# Patient Record
Sex: MALE | Race: WHITE | NOT HISPANIC OR LATINO | Employment: PART TIME | URBAN - METROPOLITAN AREA
[De-identification: names, ages, dates, MRNs, and addresses within clinical notes are randomized per-mention and may not be internally consistent; named-entity substitution may affect disease eponyms.]

---

## 2023-10-25 ENCOUNTER — OFFICE VISIT (OUTPATIENT)
Dept: CARDIOLOGY | Facility: CLINIC | Age: 68
End: 2023-10-25
Payer: MEDICARE

## 2023-10-25 VITALS
DIASTOLIC BLOOD PRESSURE: 60 MMHG | SYSTOLIC BLOOD PRESSURE: 126 MMHG | HEIGHT: 75 IN | WEIGHT: 315 LBS | HEART RATE: 94 BPM | BODY MASS INDEX: 39.17 KG/M2

## 2023-10-25 DIAGNOSIS — I25.84 CORONARY ARTERY CALCIFICATION: Primary | ICD-10-CM

## 2023-10-25 DIAGNOSIS — I10 ESSENTIAL HYPERTENSION: ICD-10-CM

## 2023-10-25 DIAGNOSIS — I25.10 CORONARY ARTERY CALCIFICATION: Primary | ICD-10-CM

## 2023-10-25 PROCEDURE — 1159F MED LIST DOCD IN RCRD: CPT | Performed by: INTERNAL MEDICINE

## 2023-10-25 PROCEDURE — 99204 OFFICE O/P NEW MOD 45 MIN: CPT | Performed by: INTERNAL MEDICINE

## 2023-10-25 PROCEDURE — 1160F RVW MEDS BY RX/DR IN RCRD: CPT | Performed by: INTERNAL MEDICINE

## 2023-10-25 PROCEDURE — 93000 ELECTROCARDIOGRAM COMPLETE: CPT | Performed by: INTERNAL MEDICINE

## 2023-10-25 RX ORDER — AMOXICILLIN 500 MG
500 CAPSULE ORAL DAILY
COMMUNITY

## 2023-10-25 RX ORDER — ASPIRIN 81 MG/1
81 TABLET ORAL DAILY
Qty: 90 TABLET | Refills: 3 | Status: SHIPPED | OUTPATIENT
Start: 2023-10-25

## 2023-10-25 RX ORDER — ATORVASTATIN CALCIUM 10 MG/1
10 TABLET, FILM COATED ORAL DAILY
Qty: 30 TABLET | Refills: 11 | Status: SHIPPED | OUTPATIENT
Start: 2023-10-25 | End: 2023-10-26

## 2023-10-25 RX ORDER — IRBESARTAN 150 MG/1
150 TABLET ORAL DAILY
COMMUNITY
Start: 2023-10-06

## 2023-10-25 NOTE — PROGRESS NOTES
[facility]      Patient Name: Taiwo Shannon  :1955  Age: 68 y.o.  Encounter Provider:  Tom Alegre Jr, MD      Chief Complaint:   Chief Complaint   Patient presents with    evalated cardiac calcium score         HPI  Taiwo Shannon is a 68 y.o. male past medical history morbid obesity, hypertension with family history of coronary artery disease presents for initial evaluation of abnormal coronary calcium score.  Patient has 1 brother who had an LAD infarct at age 66 and another who had a calcium score of 1400.  He decided to get calcium scoring on his own and this came back at 757.  He is not very active these days but with the activities of daily living he denies chest pain or shortness of air.  No orthopnea, PND or edema.  No palpitations, dizziness or syncope.  He smokes 1 cigar daily and denies alcohol or illicit drug use.  No cardiac complaints at time of interview.      The following portions of the patient's history were reviewed and updated as appropriate: allergies, current medications, past family history, past medical history, past social history, past surgical history and problem list.      Review of Systems   Constitutional: Negative for chills and fever.   HENT:  Negative for hoarse voice and sore throat.    Eyes:  Negative for double vision and photophobia.   Cardiovascular:  Positive for palpitations. Negative for chest pain, leg swelling, near-syncope, orthopnea, paroxysmal nocturnal dyspnea and syncope.   Respiratory:  Negative for cough and wheezing.    Skin:  Negative for poor wound healing and rash.   Musculoskeletal:  Negative for arthritis and joint swelling.   Gastrointestinal:  Negative for bloating, abdominal pain, hematemesis and hematochezia.   Neurological:  Negative for dizziness and focal weakness.   Psychiatric/Behavioral:  Negative for depression and suicidal ideas.      OBJECTIVE:   Vital Signs  Vitals:    10/25/23 1318   BP: 126/60   Pulse:  "94     Estimated body mass index is 40.8 kg/m² as calculated from the following:    Height as of this encounter: 190.5 cm (75\").    Weight as of this encounter: 148 kg (326 lb 6.4 oz).    Vitals reviewed.   Constitutional:       Appearance: Healthy appearance. Not in distress.   Neck:      Vascular: No JVR. JVD normal.   Pulmonary:      Effort: Pulmonary effort is normal.      Breath sounds: Normal breath sounds. No wheezing. No rhonchi. No rales.   Chest:      Chest wall: Not tender to palpatation.   Cardiovascular:      PMI at left midclavicular line. Normal rate. Regular rhythm. Normal S1. Normal S2.       Murmurs: There is no murmur.      No gallop.  No click. No rub.   Pulses:     Intact distal pulses.   Edema:     Peripheral edema absent.   Abdominal:      General: Bowel sounds are normal.      Palpations: Abdomen is soft.      Tenderness: There is no abdominal tenderness.   Musculoskeletal: Normal range of motion.         General: No tenderness. Skin:     General: Skin is warm and dry.   Neurological:      General: No focal deficit present.      Mental Status: Alert and oriented to person, place and time.       ECG 12 Lead    Date/Time: 10/25/2023 1:25 PM  Performed by: Tom Alegre Jr., MD    Authorized by: Tom Alegre Jr., MD  Comparison: not compared with previous ECG   Previous ECG: no previous ECG available  Rhythm: sinus rhythm  Ectopy: unifocal PVCs    Clinical impression: non-specific ECG           No results found for: \"BUN\", \"CREATININE\", \"K\", \"ALT\", \"AST\"        ASSESSMENT:     68-year-old male with past medical history of morbid obesity, essential hypertension and family history coronary artery disease presents for abnormal coronary calcium score      PLAN OF CARE:     Coronary artery calcification -moderately elevated risk of major adverse cardiac event given this calcium score.  Start aspirin and statin.  Plan for treadmill stress study.  Counseled on need for weight loss and increased " activity.  Family history coronary artery disease -diagnostic testing as above  Essential hypertension -seemingly well controlled at this time.  Twice daily blood pressure log and sodium restricted diet is counseled.    Return to clinic 6 months         Discharge Medications            Accurate as of October 25, 2023  1:25 PM. If you have any questions, ask your nurse or doctor.                Continue These Medications        Instructions Start Date   FISH OIL PO   1,200 mg, Oral, Daily      irbesartan 150 MG tablet  Commonly known as: AVAPRO   150 mg, Oral, Daily      Niacin Flush Free 500 MG capsule  Generic drug: Inositol Niacinate   500 mg, Oral, Daily      OSTEO BI-FLEX ONE PER DAY PO   Oral, Daily      TURMERIC CURCUMIN PO   500 mg, Oral, Daily      VITAMIN C PO   1,000 mg, Oral, Daily      VITAMIN D-3 PO   Oral, Daily               Thank you for allowing me to participate in the care of your patient,      Sincerely,   Tom Alegre MD  [facility]  10/25/23  13:25 EDT

## 2023-10-26 RX ORDER — ATORVASTATIN CALCIUM 10 MG/1
10 TABLET, FILM COATED ORAL DAILY
Qty: 90 TABLET | Refills: 0 | Status: SHIPPED | OUTPATIENT
Start: 2023-10-26

## 2023-11-13 ENCOUNTER — TELEPHONE (OUTPATIENT)
Dept: CARDIOLOGY | Facility: CLINIC | Age: 68
End: 2023-11-13

## 2023-11-13 NOTE — TELEPHONE ENCOUNTER
Caller: Taiwo Shannon    Relationship to patient: Self    Best call back number: [phone] (home)      Type of visit: STRESS TEST     Requested date:  ON 11.28.23 OR AFTER     If rescheduling, when is the original appointment: 11.14.23     Additional notes: PT CALLED IN WANTING TO BE ADVISED IF HE SHOULD COME IN FOR STRESS TEST WITH MILD COLD AND COUGH SYMPTOMS, TOLD PT HE PROBABLY SHOULD R/S APPT, UNABLE TO WT PT OVER TO SCHEDULING. PLEASE CANCEL APPT ON 11.14.23 AND CALL PT TO R/S

## 2023-12-12 ENCOUNTER — HOSPITAL ENCOUNTER (OUTPATIENT)
Dept: CARDIOLOGY | Facility: HOSPITAL | Age: 68
Discharge: HOME | End: 2023-12-12
Admitting: INTERNAL MEDICINE
Payer: MEDICARE

## 2023-12-12 ENCOUNTER — TELEPHONE (OUTPATIENT)
Dept: CARDIOLOGY | Facility: CLINIC | Age: 68
End: 2023-12-12
Payer: MEDICARE

## 2023-12-12 DIAGNOSIS — I25.84 CORONARY ARTERY CALCIFICATION: ICD-10-CM

## 2023-12-12 DIAGNOSIS — I25.10 CORONARY ARTERY CALCIFICATION: ICD-10-CM

## 2023-12-12 LAB
BH CV STRESS BP STAGE 1: NORMAL
BH CV STRESS BP STAGE 2: NORMAL
BH CV STRESS DURATION MIN STAGE 1: 3
BH CV STRESS DURATION MIN STAGE 2: 3
BH CV STRESS DURATION SEC STAGE 1: 0
BH CV STRESS DURATION SEC STAGE 2: 0
BH CV STRESS GRADE STAGE 1: 10
BH CV STRESS GRADE STAGE 2: 12
BH CV STRESS HR STAGE 1: 146
BH CV STRESS HR STAGE 2: 160
BH CV STRESS METS STAGE 1: 5
BH CV STRESS METS STAGE 2: 7.5
BH CV STRESS PROTOCOL 1: NORMAL
BH CV STRESS RECOVERY BP: NORMAL MMHG
BH CV STRESS RECOVERY HR: 103 BPM
BH CV STRESS SPEED STAGE 1: 1.7
BH CV STRESS SPEED STAGE 2: 2.5
BH CV STRESS STAGE 1: 1
BH CV STRESS STAGE 2: 2
MAXIMAL PREDICTED HEART RATE: 153 BPM
PERCENT MAX PREDICTED HR: 104.58 %
STRESS BASELINE BP: NORMAL MMHG
STRESS BASELINE HR: 98 BPM
STRESS PERCENT HR: 123 %
STRESS POST ESTIMATED WORKLOAD: 7.5 METS
STRESS POST EXERCISE DUR MIN: 6 MIN
STRESS POST EXERCISE DUR SEC: 0 SEC
STRESS POST PEAK BP: NORMAL MMHG
STRESS POST PEAK HR: 160 BPM
STRESS TARGET HR: 130 BPM

## 2023-12-12 PROCEDURE — 93016 CV STRESS TEST SUPVJ ONLY: CPT | Performed by: INTERNAL MEDICINE

## 2023-12-12 PROCEDURE — 93018 CV STRESS TEST I&R ONLY: CPT | Performed by: INTERNAL MEDICINE

## 2023-12-12 PROCEDURE — 93017 CV STRESS TEST TRACING ONLY: CPT

## 2023-12-12 NOTE — TELEPHONE ENCOUNTER
I spoke with Taiwo Shannon and updated pt on results/recommendations from provider.  Pt verbalized understanding and has no further questions at this time.    Thank you,    Daniela PEREZ, RN  Triage LC  12/12/23 12:23 EST

## 2023-12-12 NOTE — TELEPHONE ENCOUNTER
Please inform patient treadmill ECG is normal and shows no evidence of tightly blocked coronary artery.  Okay to keep next scheduled office follow-up appointment

## 2024-04-25 ENCOUNTER — OFFICE VISIT (OUTPATIENT)
Dept: CARDIOLOGY | Facility: CLINIC | Age: 69
End: 2024-04-25
Payer: MEDICARE

## 2024-04-25 VITALS
DIASTOLIC BLOOD PRESSURE: 70 MMHG | BODY MASS INDEX: 39.17 KG/M2 | HEART RATE: 67 BPM | HEIGHT: 75 IN | SYSTOLIC BLOOD PRESSURE: 120 MMHG | WEIGHT: 315 LBS

## 2024-04-25 DIAGNOSIS — I25.10 CORONARY ARTERY CALCIFICATION: Primary | ICD-10-CM

## 2024-04-25 DIAGNOSIS — I25.84 CORONARY ARTERY CALCIFICATION: Primary | ICD-10-CM

## 2024-04-25 DIAGNOSIS — E78.5 HYPERLIPIDEMIA LDL GOAL <70: ICD-10-CM

## 2024-04-25 PROCEDURE — 1159F MED LIST DOCD IN RCRD: CPT | Performed by: NURSE PRACTITIONER

## 2024-04-25 PROCEDURE — 93000 ELECTROCARDIOGRAM COMPLETE: CPT | Performed by: NURSE PRACTITIONER

## 2024-04-25 PROCEDURE — 99214 OFFICE O/P EST MOD 30 MIN: CPT | Performed by: NURSE PRACTITIONER

## 2024-04-25 PROCEDURE — 1160F RVW MEDS BY RX/DR IN RCRD: CPT | Performed by: NURSE PRACTITIONER

## 2024-04-25 NOTE — PROGRESS NOTES
"Date of Office Visit: 24  Encounter Provider: ARNOLD Nolen  Place of Service: [facility] CARDIOLOG[facility]  Patient Name: Taiwo Shannon  :1955    Chief Complaint   Patient presents with    Coronary artery calcification    Follow-up   :     HPI: Taiwo Shannon is a 69 y.o. male  with hyperlipidemia, hypertension, coronary calcification, arthritis, premature ventricular contraction, obstructive sleep apnea and obesity.  He is followed by Dr. Tom Alegre.  I will visit with him for the first time I have reviewed his medical record    He had coronary calcium score at [facility] which showed Agatston score of 757 in 2022.  He had treadmill ECG study 2023 which showed no evidence of ischemia.  There were rare PVCs noted.    He presents today for 6-month reassessment.  Unfortunately, he has gained a little weight.  He has \"a little\" shortness of breath at times when he overexerts.  He has joint pain which limits his mobility.  He has not been exercising routinely.  He continues to smoke a cigar a day.  He is compliant with CPAP reportedly.  He has intermittent palpitation.  He may have several in a day and then he may have days where he has no palpitations.  He has had these intermittently since his 20s reportedly.  He denies chest pain or dizziness.  He is meeting with a new primary care physician in a month and states he will have blood work at that time.    No Known Allergies        Family and social history reviewed.     ROS  All other systems were reviewed and are negative          Objective:     Vitals:    24 0845   BP: 120/70   BP Location: Left arm   Patient Position: Sitting   Pulse: 67   Weight: (!) 152 kg (334 lb)   Height: 190.5 cm (75\")     Body mass index is 41.75 kg/m².    PHYSICAL EXAM:  Pulmonary:      Effort: Pulmonary effort is normal.      Breath sounds: Normal breath sounds.   Cardiovascular:      Normal rate. Regular rhythm. "           ECG 12 Lead    Date/Time: 4/25/2024 9:16 AM  Performed by: Mima Gentile APRN    Authorized by: Mima Gentile APRN  Comparison: compared with previous ECG   Similar to previous ECG  Rhythm: sinus rhythm  Rate: normal  Q waves: III    QRS axis: normal  Comments: No change            Current Outpatient Medications   Medication Sig Dispense Refill    Ascorbic Acid (VITAMIN C PO) Take 1,000 mg by mouth Daily.      aspirin 81 MG EC tablet Take 1 tablet by mouth Daily. 90 tablet 3    atorvastatin (LIPITOR) 10 MG tablet TAKE 1 TABLET BY MOUTH DAILY 90 tablet 0    Boswellia-Glucosamine-Vit D (OSTEO BI-FLEX ONE PER DAY PO) Take  by mouth Daily.      Cholecalciferol (VITAMIN D-3 PO) Take  by mouth Daily.      irbesartan (AVAPRO) 150 MG tablet Take 1 tablet by mouth Daily.      Omega-3 Fatty Acids (FISH OIL PO) Take 1,200 mg by mouth Daily.      TURMERIC CURCUMIN PO Take 500 mg by mouth Daily.       No current facility-administered medications for this visit.     Assessment:       Diagnosis Plan   1. Coronary artery calcification  ECG 12 Lead      2. Hyperlipidemia LDL goal <70  ECG 12 Lead           Orders Placed This Encounter   Procedures    ECG 12 Lead     This order was created via procedure documentation     Order Specific Question:   Release to patient     Answer:   Routine Release [[id number]]         Plan:         1.  69-year-old gentleman with coronary artery calcification and Agatston score of 757 in 2022.  Nonischemic treadmill ECG study December 2023.  He will continue aspirin and atorvastatin 10 mg at this time.  I encouraged increased exercise, weight loss with diet modification and for him to quit smoking cigars daily  2.  Hypertension-blood pressure appears well-controlled on current regimen  3.  Hyperlipidemia on atorvastatin 10 mg along with omega-3 fatty acid.  He remains off niacin.  Target LDL 70 or less.  He reportedly will have blood work next month with a new PCP  4.  PVCs-stable  palpitations  5.  Obstructive sleep apnea reports compliant with CPAP  6.  Obesity-BMI 41.75.  We had a candid discussion about his weight loss in the last 6 months.  We discussed need for weight loss and diet modification and routine exercise.  7.  Arthritis  8.  Current cigar smoker-daily.  He will try to cut back.  Follow-up in 6 months.  He will call with any issues.          It has been a pleasure to participate in this patient's care.      Thank you,  ARNOLD Nolen      **I used Dragon to dictate this note:**

## 2024-11-06 ENCOUNTER — OFFICE VISIT (OUTPATIENT)
Dept: CARDIOLOGY | Facility: CLINIC | Age: 69
End: 2024-11-06
Payer: MEDICARE

## 2024-11-06 ENCOUNTER — OFFICE VISIT (OUTPATIENT)
Dept: FAMILY MEDICINE CLINIC | Facility: CLINIC | Age: 69
End: 2024-11-06
Payer: MEDICARE

## 2024-11-06 VITALS
HEIGHT: 75 IN | BODY MASS INDEX: 39.17 KG/M2 | OXYGEN SATURATION: 96 % | SYSTOLIC BLOOD PRESSURE: 136 MMHG | HEART RATE: 94 BPM | WEIGHT: 315 LBS | DIASTOLIC BLOOD PRESSURE: 80 MMHG

## 2024-11-06 VITALS
DIASTOLIC BLOOD PRESSURE: 76 MMHG | OXYGEN SATURATION: 97 % | BODY MASS INDEX: 39.17 KG/M2 | WEIGHT: 315 LBS | HEIGHT: 75 IN | HEART RATE: 90 BPM | SYSTOLIC BLOOD PRESSURE: 136 MMHG

## 2024-11-06 DIAGNOSIS — I25.10 CORONARY ARTERY CALCIFICATION: Primary | ICD-10-CM

## 2024-11-06 DIAGNOSIS — E66.01 SEVERE OBESITY (BMI >= 40): ICD-10-CM

## 2024-11-06 DIAGNOSIS — L98.9 SKIN LESION OF RIGHT LOWER LIMB: ICD-10-CM

## 2024-11-06 DIAGNOSIS — D72.820 LYMPHOCYTOSIS: ICD-10-CM

## 2024-11-06 DIAGNOSIS — Z00.00 WELLNESS EXAMINATION: Primary | ICD-10-CM

## 2024-11-06 DIAGNOSIS — I10 ESSENTIAL HYPERTENSION: ICD-10-CM

## 2024-11-06 DIAGNOSIS — R73.03 PREDIABETES: ICD-10-CM

## 2024-11-06 DIAGNOSIS — E78.2 MIXED HYPERLIPIDEMIA: ICD-10-CM

## 2024-11-06 DIAGNOSIS — E78.5 HYPERLIPIDEMIA LDL GOAL <70: ICD-10-CM

## 2024-11-06 PROCEDURE — 99214 OFFICE O/P EST MOD 30 MIN: CPT | Performed by: INTERNAL MEDICINE

## 2024-11-06 PROCEDURE — 1160F RVW MEDS BY RX/DR IN RCRD: CPT | Performed by: INTERNAL MEDICINE

## 2024-11-06 PROCEDURE — 1159F MED LIST DOCD IN RCRD: CPT | Performed by: INTERNAL MEDICINE

## 2024-11-06 RX ORDER — ATORVASTATIN CALCIUM 20 MG/1
20 TABLET, FILM COATED ORAL DAILY
COMMUNITY

## 2024-11-06 NOTE — PROGRESS NOTES
[facility]      Patient Name: Taiwo Shannon  :1955  Age: 69 y.o.  Encounter Provider:  Tom Alegre Jr, MD      Chief Complaint:   Chief Complaint   Patient presents with    Coronary artery calcification         HPI  Taiwo Shannon is a 69 y.o. male past medical history morbid obesity, hypertension with family history of coronary artery disease presents for follow-up evaluation of abnormal coronary calcium score.     Last clinic visit note: Patient has 1 brother who had an LAD infarct at age 66 and another who had a calcium score of 1400.  He decided to get calcium scoring on his own and this came back at 757.  He is not very active these days but with the activities of daily living he denies chest pain or shortness of air.  No orthopnea, PND or edema.  No palpitations, dizziness or syncope.  He smokes 1 cigar daily and denies alcohol or illicit drug use.  No cardiac complaints at time of interview.    Treadmill stress study with 6 minutes completed on [facility] treadmill protocol and no evidence of ischemia.  He is lost about 6 pounds over the last few months.  Blood pressure seems well-controlled.  No chest pain or shortness of air with activity.  No orthopnea, PND or edema.  No palpitations, dizziness or syncope.    The following portions of the patient's history were reviewed and updated as appropriate: allergies, current medications, past family history, past medical history, past social history, past surgical history and problem list.      Review of Systems   Constitutional: Negative for chills and fever.   HENT:  Negative for hoarse voice and sore throat.    Eyes:  Negative for double vision and photophobia.   Cardiovascular:  Positive for palpitations. Negative for chest pain, leg swelling, near-syncope, orthopnea, paroxysmal nocturnal dyspnea and syncope.   Respiratory:  Negative for cough and wheezing.    Skin:  Negative for poor wound healing and rash.   Musculoskeletal:   "Negative for arthritis and joint swelling.   Gastrointestinal:  Negative for bloating, abdominal pain, hematemesis and hematochezia.   Neurological:  Negative for dizziness and focal weakness.   Psychiatric/Behavioral:  Negative for depression and suicidal ideas.      OBJECTIVE:   Vital Signs  Vitals:    11/06/24 0906   BP: 136/80   Pulse: 94   SpO2: 96%     Estimated body mass index is 40.75 kg/m² as calculated from the following:    Height as of this encounter: 190.5 cm (75\").    Weight as of this encounter: 148 kg (326 lb).    Vitals reviewed.   Constitutional:       Appearance: Healthy appearance. Not in distress.   Neck:      Vascular: No JVR. JVD normal.   Pulmonary:      Effort: Pulmonary effort is normal.      Breath sounds: Normal breath sounds. No wheezing. No rhonchi. No rales.   Chest:      Chest wall: Not tender to palpatation.   Cardiovascular:      PMI at left midclavicular line. Normal rate. Regular rhythm. Normal S1. Normal S2.       Murmurs: There is no murmur.      No gallop.  No click. No rub.   Pulses:     Intact distal pulses.   Edema:     Peripheral edema absent.   Abdominal:      General: Bowel sounds are normal.      Palpations: Abdomen is soft.      Tenderness: There is no abdominal tenderness.   Musculoskeletal: Normal range of motion.         General: No tenderness. Skin:     General: Skin is warm and dry.   Neurological:      General: No focal deficit present.      Mental Status: Alert and oriented to person, place and time.     Procedures         No results found for: \"BUN\", \"CREATININE\", \"K\", \"ALT\", \"AST\"        ASSESSMENT:     68-year-old male with past medical history of morbid obesity, essential hypertension and family history coronary artery disease presents for abnormal coronary calcium score      PLAN OF CARE:     Coronary artery calcification -moderately elevated risk of major adverse cardiac event given this calcium score.  Continue aspirin and statin.  Latest lipid profile " requested from PCP office.  Family history coronary artery disease -diagnostic testing as above  Essential hypertension -seemingly well controlled at this time.  Twice daily blood pressure log and sodium restricted diet is counseled.    Return to clinic 12 months         Discharge Medications            Accurate as of November 6, 2024  9:09 AM. If you have any questions, ask your nurse or doctor.                Changes to Medications        Instructions Start Date   atorvastatin 10 MG tablet  Commonly known as: LIPITOR  What changed: how much to take   10 mg, Oral, Daily             Continue These Medications        Instructions Start Date   aspirin 81 MG EC tablet   81 mg, Oral, Daily      FISH OIL PO   1,200 mg, Daily      irbesartan 150 MG tablet  Commonly known as: AVAPRO   150 mg, Daily      OSTEO BI-FLEX ONE PER DAY PO   Daily      TURMERIC CURCUMIN PO   500 mg, Daily      VITAMIN C PO   1,000 mg, Daily      VITAMIN D-3 PO   Daily               Thank you for allowing me to participate in the care of your patient,      Sincerely,   Tom Alegre MD  [facility]  11/06/24  09:09 EST

## 2024-11-08 ENCOUNTER — TELEPHONE (OUTPATIENT)
Dept: FAMILY MEDICINE CLINIC | Facility: CLINIC | Age: 69
End: 2024-11-08
Payer: MEDICARE

## 2024-11-08 NOTE — TELEPHONE ENCOUNTER
Dr. Kong, This is what they sent me regarding this patient   ROUTING BACK TO AMBULATORY- WE NEED LABS FOR PT DX, THERE ARE NOT ANY IN PT CHART,             Thanks, Anna

## 2024-11-14 RX ORDER — ASPIRIN 81 MG/1
81 TABLET ORAL DAILY
Qty: 90 TABLET | Refills: 0 | Status: SHIPPED | OUTPATIENT
Start: 2024-11-14

## 2025-01-21 ENCOUNTER — TELEPHONE (OUTPATIENT)
Dept: FAMILY MEDICINE CLINIC | Facility: CLINIC | Age: 70
End: 2025-01-21
Payer: MEDICARE

## 2025-01-21 RX ORDER — IRBESARTAN 150 MG/1
150 TABLET ORAL DAILY
Qty: 90 TABLET | Refills: 3 | Status: SHIPPED | OUTPATIENT
Start: 2025-01-21

## 2025-01-21 NOTE — TELEPHONE ENCOUNTER
ANEUDY CAME BY THE OFFICE TODAY AND SAID THAT HIS PREVIOUS DR HAD PRESCRIBED THIS MEDICATION,IRBESARTAN 150 MG TABLETS. HE SAID THAT HE FORGOT TO SAY SOMETHING TO YOU AT HIS VISIT. PHARMACY IS [facility] [address].

## 2025-01-22 RX ORDER — IRBESARTAN 150 MG/1
150 TABLET ORAL DAILY
Qty: 90 TABLET | OUTPATIENT
Start: 2025-01-22

## 2025-02-03 ENCOUNTER — TELEPHONE (OUTPATIENT)
Dept: ONCOLOGY | Facility: CLINIC | Age: 70
End: 2025-02-03
Payer: MEDICARE

## 2025-02-03 NOTE — TELEPHONE ENCOUNTER
LVM FOR PATIENT TO RETURN MY CALL WANTING TO SEE IF WE COULD MOVE APPT OUT A LITTLE BIT.  I WAS GOING TO SEE IF HE COULD DO MARCH 5TH AT THE SAME TIME 1PM.

## 2025-02-04 NOTE — TELEPHONE ENCOUNTER
Hub staff attempted to follow warm transfer process and was unsuccessful     Caller: Taiwo Shannon    Relationship to patient: Self    Best call back number: [phone]    Patient is needing: PT RETURNING ROBSON'S CALL.  PT IS OK TO MOVE APPT TO  MARCH 5 AT 1PM

## 2025-02-06 RX ORDER — ATORVASTATIN CALCIUM 20 MG/1
20 TABLET, FILM COATED ORAL DAILY
Qty: 90 TABLET | Refills: 2 | Status: SHIPPED | OUTPATIENT
Start: 2025-02-06
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
## 2025-03-07 ENCOUNTER — APPOINTMENT (OUTPATIENT)
Dept: LAB | Facility: HOSPITAL | Age: 70
End: 2025-03-07
Payer: MEDICARE

## 2025-03-07 ENCOUNTER — CONSULT (OUTPATIENT)
Dept: ONCOLOGY | Facility: CLINIC | Age: 70
End: 2025-03-07
Payer: MEDICARE

## 2025-03-07 VITALS
OXYGEN SATURATION: 97 % | BODY MASS INDEX: 39.17 KG/M2 | RESPIRATION RATE: 20 BRPM | HEIGHT: 75 IN | WEIGHT: 315 LBS | SYSTOLIC BLOOD PRESSURE: 140 MMHG | HEART RATE: 99 BPM | DIASTOLIC BLOOD PRESSURE: 84 MMHG | TEMPERATURE: 98.2 F

## 2025-03-07 DIAGNOSIS — C91.10 CLL (CHRONIC LYMPHOCYTIC LEUKEMIA): ICD-10-CM

## 2025-03-07 DIAGNOSIS — D72.820 LYMPHOCYTOSIS: Primary | ICD-10-CM

## 2025-03-07 DIAGNOSIS — R74.02 ELEVATION OF LEVELS OF LACTIC ACID DEHYDROGENASE (LDH): ICD-10-CM

## 2025-03-07 PROBLEM — I65.29 CAROTID ATHEROSCLEROSIS: Status: ACTIVE | Noted: 2024-05-07

## 2025-03-07 LAB
ALBUMIN SERPL-MCNC: 4.4 G/DL (ref 3.5–5.2)
ALBUMIN/GLOB SERPL: 1.6 G/DL
ALP SERPL-CCNC: 87 U/L (ref 39–117)
ALT SERPL W P-5'-P-CCNC: 29 U/L (ref 1–41)
ANION GAP SERPL CALCULATED.3IONS-SCNC: 10.5 MMOL/L (ref 5–15)
AST SERPL-CCNC: 22 U/L (ref 1–40)
B2 MICROGLOB SERPL-MCNC: 3 MG/L (ref 0.8–2.2)
BASOPHILS # BLD AUTO: 0.07 10*3/MM3 (ref 0–0.2)
BASOPHILS NFR BLD AUTO: 0.3 % (ref 0–1.5)
BILIRUB SERPL-MCNC: 0.4 MG/DL (ref 0–1.2)
BUN SERPL-MCNC: 19 MG/DL (ref 8–23)
BUN/CREAT SERPL: 15.6 (ref 7–25)
CALCIUM SPEC-SCNC: 9.2 MG/DL (ref 8.6–10.5)
CHLORIDE SERPL-SCNC: 104 MMOL/L (ref 98–107)
CO2 SERPL-SCNC: 24.5 MMOL/L (ref 22–29)
CREAT SERPL-MCNC: 1.22 MG/DL (ref 0.76–1.27)
DEPRECATED RDW RBC AUTO: 48 FL (ref 37–54)
EGFRCR SERPLBLD CKD-EPI 2021: 64.6 ML/MIN/1.73
EOSINOPHIL # BLD AUTO: 0.11 10*3/MM3 (ref 0–0.4)
EOSINOPHIL NFR BLD AUTO: 0.5 % (ref 0.3–6.2)
ERYTHROCYTE [DISTWIDTH] IN BLOOD BY AUTOMATED COUNT: 14.9 % (ref 12.3–15.4)
GLOBULIN UR ELPH-MCNC: 2.8 GM/DL
GLUCOSE SERPL-MCNC: 127 MG/DL (ref 65–99)
HAV IGM SERPL QL IA: NORMAL
HBV CORE IGM SERPL QL IA: NORMAL
HBV SURFACE AG SERPL QL IA: NORMAL
HCT VFR BLD AUTO: 46.2 % (ref 37.5–51)
HCV AB SER QL: NORMAL
HGB BLD-MCNC: 14.4 G/DL (ref 13–17.7)
IGG1 SER-MCNC: 1065 MG/DL (ref 700–1600)
LDH SERPL-CCNC: 133 U/L (ref 135–225)
LYMPHOCYTES NFR BLD AUTO: ABNORMAL %
MCH RBC QN AUTO: 28 PG (ref 26.6–33)
MCHC RBC AUTO-ENTMCNC: 31.2 G/DL (ref 31.5–35.7)
MCV RBC AUTO: 89.9 FL (ref 79–97)
MONOCYTES NFR BLD AUTO: ABNORMAL %
NEUTROPHILS NFR BLD AUTO: ABNORMAL %
PATHOLOGY REVIEW: YES
PLATELET # BLD AUTO: 172 10*3/MM3 (ref 140–450)
PMV BLD AUTO: 9.8 FL (ref 6–12)
POTASSIUM SERPL-SCNC: 4.4 MMOL/L (ref 3.5–5.2)
PROT SERPL-MCNC: 7.2 G/DL (ref 6–8.5)
RBC # BLD AUTO: 5.14 10*6/MM3 (ref 4.14–5.8)
SODIUM SERPL-SCNC: 139 MMOL/L (ref 136–145)
URATE SERPL-MCNC: 6 MG/DL (ref 3.4–7)
WBC NRBC COR # BLD AUTO: 20.51 10*3/MM3 (ref 3.4–10.8)

## 2025-03-07 PROCEDURE — 36415 COLL VENOUS BLD VENIPUNCTURE: CPT | Performed by: INTERNAL MEDICINE

## 2025-03-07 PROCEDURE — 84550 ASSAY OF BLOOD/URIC ACID: CPT | Performed by: INTERNAL MEDICINE

## 2025-03-07 PROCEDURE — 80053 COMPREHEN METABOLIC PANEL: CPT | Performed by: INTERNAL MEDICINE

## 2025-03-07 PROCEDURE — 83615 LACTATE (LD) (LDH) ENZYME: CPT | Performed by: INTERNAL MEDICINE

## 2025-03-07 PROCEDURE — 82784 ASSAY IGA/IGD/IGG/IGM EACH: CPT | Performed by: INTERNAL MEDICINE

## 2025-03-07 PROCEDURE — 85025 COMPLETE CBC W/AUTO DIFF WBC: CPT | Performed by: INTERNAL MEDICINE

## 2025-03-07 PROCEDURE — 80074 ACUTE HEPATITIS PANEL: CPT | Performed by: INTERNAL MEDICINE

## 2025-03-07 PROCEDURE — 82232 ASSAY OF BETA-2 PROTEIN: CPT | Performed by: INTERNAL MEDICINE

## 2025-03-10 ENCOUNTER — PATIENT ROUNDING (BHMG ONLY) (OUTPATIENT)
Dept: ONCOLOGY | Facility: CLINIC | Age: 70
End: 2025-03-10
Payer: MEDICARE

## 2025-03-10 LAB
LAB AP CASE REPORT: NORMAL
PATH REPORT.FINAL DX SPEC: NORMAL

## 2025-03-10 NOTE — PROGRESS NOTES
March 10, 2025    Hello, may I speak with Taiwo Shannon?    My name is Shayy Spaulding      I am  with [facility]  [address] [place] [address] [phone].    Before we get started may I verify your date of birth? 1955    I am calling to officially welcome you to our practice and ask about your recent visit. Is this a good time to talk? no    Tell me about your visit with us. What things went well?  A My Chart message was sent to the patient.        We're always looking for ways to make our patients' experiences even better. Do you have recommendations on ways we may improve?  no    Overall were you satisfied with your first visit to our practice? yes       I appreciate you taking the time to speak with me today. Is there anything else I can do for you? no      Thank you, and have a great day.

## 2025-03-11 LAB
ABNORMAL WBC NFR SPEC FC: NORMAL %
ANNOTATION COMMENT IMP: NORMAL
ASSESSMENT OF LEUKOCYTES: NORMAL
CLINICAL INFO: NORMAL
GATING STRATEGY: NORMAL
IMMUNOPHENOTYPING STUDY: NORMAL
LABORATORY COMMENT REPORT: NORMAL
PATH INTERP SPEC-IMP: NORMAL
PATHOLOGIST NAME: NORMAL
SPECIMEN SOURCE: NORMAL
VIABLE CELLS NFR SPEC: NORMAL %

## 2025-03-26 PROBLEM — C91.10 CLL (CHRONIC LYMPHOCYTIC LEUKEMIA): Status: ACTIVE | Noted: 2025-03-26
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
## 2025-03-31 ENCOUNTER — OFFICE VISIT (OUTPATIENT)
Dept: ONCOLOGY | Facility: CLINIC | Age: 70
End: 2025-03-31
Payer: MEDICARE

## 2025-03-31 VITALS
WEIGHT: 315 LBS | DIASTOLIC BLOOD PRESSURE: 84 MMHG | OXYGEN SATURATION: 99 % | BODY MASS INDEX: 39.17 KG/M2 | TEMPERATURE: 98.4 F | SYSTOLIC BLOOD PRESSURE: 133 MMHG | HEIGHT: 75 IN | HEART RATE: 101 BPM

## 2025-03-31 DIAGNOSIS — D72.820 LYMPHOCYTOSIS: ICD-10-CM

## 2025-03-31 DIAGNOSIS — C91.10 CLL (CHRONIC LYMPHOCYTIC LEUKEMIA): Primary | ICD-10-CM

## 2025-03-31 PROCEDURE — 3079F DIAST BP 80-89 MM HG: CPT | Performed by: INTERNAL MEDICINE

## 2025-03-31 PROCEDURE — 1126F AMNT PAIN NOTED NONE PRSNT: CPT | Performed by: INTERNAL MEDICINE

## 2025-03-31 PROCEDURE — 99213 OFFICE O/P EST LOW 20 MIN: CPT | Performed by: INTERNAL MEDICINE

## 2025-03-31 PROCEDURE — 3075F SYST BP GE 130 - 139MM HG: CPT | Performed by: INTERNAL MEDICINE

## 2025-03-31 NOTE — PATIENT INSTRUCTIONS
RTC in approx 3 months with labs a few days prior to MD, please call if any worsening anemia symptoms or noticeable bleeding/bruising in interim    We'll call you when the final test comes back

## 2025-04-01 ENCOUNTER — LAB (OUTPATIENT)
Dept: LAB | Facility: HOSPITAL | Age: 70
End: 2025-04-01
Payer: MEDICARE

## 2025-04-01 ENCOUNTER — TELEPHONE (OUTPATIENT)
Dept: ONCOLOGY | Facility: CLINIC | Age: 70
End: 2025-04-01
Payer: MEDICARE

## 2025-04-01 DIAGNOSIS — D72.820 LYMPHOCYTOSIS: ICD-10-CM

## 2025-04-01 DIAGNOSIS — C91.10 CLL (CHRONIC LYMPHOCYTIC LEUKEMIA): ICD-10-CM

## 2025-04-01 NOTE — TELEPHONE ENCOUNTER
Attempted to call pt and let him know that the CLL target gene panel was not received when it was sent out and that he would need to be scheduled to come in and have it redrawn.  Voicemail left for pt to call my direct number so that I can get him scheduled for a lab appointment.

## 2025-04-03 LAB
BLOOD OR BONE MARROW RESULT: NORMAL
Lab: NORMAL

## 2025-04-05 LAB
CLL TARGETGENE PANEL RESULT: NORMAL
TARGETGENE RESULT: NORMAL

## 2025-04-09 LAB
CYTOGENETICS RESULT: NORMAL
IGVH RESULT: NORMAL

## 2025-04-10 LAB — CCV RESULT: NORMAL
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
## 2025-06-11 ENCOUNTER — LAB (OUTPATIENT)
Dept: LAB | Facility: HOSPITAL | Age: 70
End: 2025-06-11
Payer: MEDICARE

## 2025-06-11 DIAGNOSIS — C91.10 CLL (CHRONIC LYMPHOCYTIC LEUKEMIA): ICD-10-CM

## 2025-06-11 LAB
ALBUMIN SERPL-MCNC: 4.1 G/DL (ref 3.5–5.2)
ALBUMIN/GLOB SERPL: 1.6 G/DL
ALP SERPL-CCNC: 101 U/L (ref 39–117)
ALT SERPL W P-5'-P-CCNC: 25 U/L (ref 1–41)
ANION GAP SERPL CALCULATED.3IONS-SCNC: 9.5 MMOL/L (ref 5–15)
AST SERPL-CCNC: 22 U/L (ref 1–40)
BASOPHILS # BLD AUTO: 0.12 10*3/MM3 (ref 0–0.2)
BASOPHILS NFR BLD AUTO: 0.5 % (ref 0–1.5)
BILIRUB SERPL-MCNC: 0.4 MG/DL (ref 0–1.2)
BUN SERPL-MCNC: 20.2 MG/DL (ref 8–23)
BUN/CREAT SERPL: 18.2 (ref 7–25)
CALCIUM SPEC-SCNC: 8.8 MG/DL (ref 8.6–10.5)
CHLORIDE SERPL-SCNC: 104 MMOL/L (ref 98–107)
CO2 SERPL-SCNC: 22.5 MMOL/L (ref 22–29)
CREAT SERPL-MCNC: 1.11 MG/DL (ref 0.76–1.27)
DEPRECATED RDW RBC AUTO: 48 FL (ref 37–54)
EGFRCR SERPLBLD CKD-EPI 2021: 71.9 ML/MIN/1.73
EOSINOPHIL # BLD AUTO: 0.19 10*3/MM3 (ref 0–0.4)
EOSINOPHIL NFR BLD AUTO: 0.8 % (ref 0.3–6.2)
ERYTHROCYTE [DISTWIDTH] IN BLOOD BY AUTOMATED COUNT: 15.1 % (ref 12.3–15.4)
GLOBULIN UR ELPH-MCNC: 2.5 GM/DL
GLUCOSE SERPL-MCNC: 138 MG/DL (ref 65–99)
HCT VFR BLD AUTO: 42.1 % (ref 37.5–51)
HGB BLD-MCNC: 13.4 G/DL (ref 13–17.7)
LDH SERPL-CCNC: 137 U/L (ref 135–225)
LYMPHOCYTES NFR BLD AUTO: ABNORMAL %
MCH RBC QN AUTO: 28.3 PG (ref 26.6–33)
MCHC RBC AUTO-ENTMCNC: 31.8 G/DL (ref 31.5–35.7)
MCV RBC AUTO: 89 FL (ref 79–97)
MONOCYTES NFR BLD AUTO: ABNORMAL %
NEUTROPHILS NFR BLD AUTO: ABNORMAL %
PLATELET # BLD AUTO: 160 10*3/MM3 (ref 140–450)
PMV BLD AUTO: 10 FL (ref 6–12)
POTASSIUM SERPL-SCNC: 4.4 MMOL/L (ref 3.5–5.2)
PROT SERPL-MCNC: 6.6 G/DL (ref 6–8.5)
RBC # BLD AUTO: 4.73 10*6/MM3 (ref 4.14–5.8)
SODIUM SERPL-SCNC: 136 MMOL/L (ref 136–145)
WBC NRBC COR # BLD AUTO: 24.08 10*3/MM3 (ref 3.4–10.8)

## 2025-06-11 PROCEDURE — 83615 LACTATE (LD) (LDH) ENZYME: CPT | Performed by: INTERNAL MEDICINE

## 2025-06-11 PROCEDURE — 80053 COMPREHEN METABOLIC PANEL: CPT | Performed by: INTERNAL MEDICINE

## 2025-06-11 PROCEDURE — 85025 COMPLETE CBC W/AUTO DIFF WBC: CPT

## 2025-06-11 PROCEDURE — 36415 COLL VENOUS BLD VENIPUNCTURE: CPT

## 2025-06-18 ENCOUNTER — OFFICE VISIT (OUTPATIENT)
Dept: ONCOLOGY | Facility: CLINIC | Age: 70
End: 2025-06-18
Payer: MEDICARE

## 2025-06-18 VITALS
DIASTOLIC BLOOD PRESSURE: 83 MMHG | HEIGHT: 75 IN | BODY MASS INDEX: 39.17 KG/M2 | OXYGEN SATURATION: 97 % | TEMPERATURE: 97.7 F | WEIGHT: 315 LBS | SYSTOLIC BLOOD PRESSURE: 128 MMHG | HEART RATE: 76 BPM

## 2025-06-18 DIAGNOSIS — C91.10 CLL (CHRONIC LYMPHOCYTIC LEUKEMIA): Primary | ICD-10-CM

## 2025-06-18 NOTE — PATIENT INSTRUCTIONS
RTC in 3-4 months with labs a few days prior to MD, please call if any worsening anemia symptoms or noticeable bleeding/bruising, worsening fatigue, unintentional weight loss, fevers, chills, drenching night sweats, lymphadenopathy.